# Patient Record
Sex: FEMALE | Race: WHITE | NOT HISPANIC OR LATINO
[De-identification: names, ages, dates, MRNs, and addresses within clinical notes are randomized per-mention and may not be internally consistent; named-entity substitution may affect disease eponyms.]

---

## 2019-07-02 PROBLEM — Z00.00 ENCOUNTER FOR PREVENTIVE HEALTH EXAMINATION: Status: ACTIVE | Noted: 2019-07-02

## 2019-07-03 ENCOUNTER — APPOINTMENT (OUTPATIENT)
Dept: HEART AND VASCULAR | Facility: CLINIC | Age: 44
End: 2019-07-03

## 2021-03-01 ENCOUNTER — APPOINTMENT (OUTPATIENT)
Dept: OTOLARYNGOLOGY | Facility: CLINIC | Age: 46
End: 2021-03-01

## 2024-05-29 ENCOUNTER — NON-APPOINTMENT (OUTPATIENT)
Age: 49
End: 2024-05-29

## 2024-05-29 ENCOUNTER — APPOINTMENT (OUTPATIENT)
Dept: HEART AND VASCULAR | Facility: CLINIC | Age: 49
End: 2024-05-29
Payer: COMMERCIAL

## 2024-05-29 VITALS
HEART RATE: 69 BPM | HEIGHT: 63 IN | DIASTOLIC BLOOD PRESSURE: 70 MMHG | BODY MASS INDEX: 23.74 KG/M2 | WEIGHT: 134 LBS | SYSTOLIC BLOOD PRESSURE: 106 MMHG

## 2024-05-29 DIAGNOSIS — Z82.49 FAMILY HISTORY OF ISCHEMIC HEART DISEASE AND OTHER DISEASES OF THE CIRCULATORY SYSTEM: ICD-10-CM

## 2024-05-29 DIAGNOSIS — I49.3 VENTRICULAR PREMATURE DEPOLARIZATION: ICD-10-CM

## 2024-05-29 DIAGNOSIS — Z78.9 OTHER SPECIFIED HEALTH STATUS: ICD-10-CM

## 2024-05-29 DIAGNOSIS — D86.9 SARCOIDOSIS, UNSPECIFIED: ICD-10-CM

## 2024-05-29 PROCEDURE — 93306 TTE W/DOPPLER COMPLETE: CPT

## 2024-05-29 PROCEDURE — ZZZZZ: CPT

## 2024-05-29 PROCEDURE — 99204 OFFICE O/P NEW MOD 45 MIN: CPT | Mod: 25

## 2024-05-29 PROCEDURE — 93000 ELECTROCARDIOGRAM COMPLETE: CPT

## 2024-05-29 NOTE — REASON FOR VISIT
[Symptom and Test Evaluation] : symptom and test evaluation [FreeTextEntry3] : Dr TORI Freeman  [FreeTextEntry1] : CV eval

## 2024-05-29 NOTE — DISCUSSION/SUMMARY
[FreeTextEntry1] : Patient has symptoms which are most suggestive extrasystoles based on her medical history of ventricular nature with no sustained tachyarrhythmias.  Patient has had minimal symptoms over the last number of days and so Holter monitoring will be of little use baseline echocardiogram revealed structurally normal heart with minimal valvular heart disease.  At the current time there is no need for treatment of the palpitations I did offer her the option of beta-blockers in the future.  Monitoring would be certainly indicated if there is a sustained prolonged periods of extrasystoles and/or arrhythmias there is no current sign of any sarcoid involvement in the myocardium cardiac MRI would be indicated to rule out sarcoid involvement in the heart which generally presents with heart block not necessarily PVCs [EKG obtained to assist in diagnosis and management of assessed problem(s)] : EKG obtained to assist in diagnosis and management of assessed problem(s)

## 2024-05-29 NOTE — REVIEW OF SYSTEMS
[Dyspnea on exertion] : dyspnea during exertion [Palpitations] : palpitations [Cough] : cough [Negative] : ENT [Headache] : no headache [Feeling Fatigued] : not feeling fatigued [SOB] : no shortness of breath [Wheezing] : no wheezing [Coughing Up Blood] : no hemoptysis [Snoring] : no snoring [Abdominal Pain] : no abdominal pain [Change in Appetite] : no change in appetite [Constipation] : no constipation

## 2024-05-29 NOTE — HISTORY OF PRESENT ILLNESS
[FreeTextEntry1] : 05/29/2024  RICARDO YOU is 49 year she with prior h/o    ( -- ) HTN    ( --  ) AODM    ( --  ) smoker  ( --  ) lipids   ( --  ) obesity   ( ++  )   fam hx of CAD present for cardiac evaluation palpations  starting 1-2 weeks ago  had racing feeling was seen in ER and noted to have PVCS  No effort related ssscp  Mild jett baseline  Had evaluation in past for sarcoid as noted on Ct scan not on meds at present

## 2024-07-23 ENCOUNTER — NON-APPOINTMENT (OUTPATIENT)
Age: 49
End: 2024-07-23

## 2024-07-24 ENCOUNTER — NON-APPOINTMENT (OUTPATIENT)
Age: 49
End: 2024-07-24

## 2024-07-24 ENCOUNTER — APPOINTMENT (OUTPATIENT)
Dept: HEART AND VASCULAR | Facility: CLINIC | Age: 49
End: 2024-07-24

## 2024-07-24 VITALS
RESPIRATION RATE: 12 BRPM | WEIGHT: 138 LBS | HEART RATE: 63 BPM | DIASTOLIC BLOOD PRESSURE: 70 MMHG | BODY MASS INDEX: 24.45 KG/M2 | SYSTOLIC BLOOD PRESSURE: 118 MMHG | HEIGHT: 63 IN

## 2024-07-24 DIAGNOSIS — D86.9 SARCOIDOSIS, UNSPECIFIED: ICD-10-CM

## 2024-07-24 DIAGNOSIS — Z00.00 ENCOUNTER FOR GENERAL ADULT MEDICAL EXAMINATION W/OUT ABNORMAL FINDINGS: ICD-10-CM

## 2024-07-24 PROCEDURE — 93225 XTRNL ECG REC<48 HRS REC: CPT

## 2024-07-24 PROCEDURE — 93000 ELECTROCARDIOGRAM COMPLETE: CPT | Mod: 59

## 2024-07-24 PROCEDURE — 99213 OFFICE O/P EST LOW 20 MIN: CPT | Mod: 25

## 2024-07-24 NOTE — HISTORY OF PRESENT ILLNESS
[FreeTextEntry1] : 05/29/2024  RICARDO YOU is 49 year she with prior h/o    ( -- ) HTN    ( --  ) AODM    ( --  ) smoker  ( --  ) lipids   ( --  ) obesity   ( ++  )   fam hx of CAD present for cardiac evaluation palpations  starting 1-2 weeks ago  had racing feeling was seen in ER and noted to have PVCS  No effort related ssscp  Mild jett baseline  Had evaluation in past for sarcoid as noted on Ct scan not on meds at present  07/24/24   frequent palpitations progressive going to [facility] for humidity related sob  episodes worse w walking resolved w rest  CT scan in 2/24 IS fibrosis ? sarcoid due to nodularity

## 2024-07-24 NOTE — DISCUSSION/SUMMARY
[FreeTextEntry1] : Ongoing palpitations  pvc noted in past ' will place Holter to assess need for meds  [facility] eval pending  [EKG obtained to assist in diagnosis and management of assessed problem(s)] : EKG obtained to assist in diagnosis and management of assessed problem(s)

## 2024-07-24 NOTE — ADDENDUM
[FreeTextEntry1] : Dalton Norris assisted in documentation on 07/24/24 acting as a scribe for Dr. Sloan Boston.

## 2024-07-30 PROCEDURE — 93227 XTRNL ECG REC<48 HR R&I: CPT

## 2024-07-31 ENCOUNTER — APPOINTMENT (OUTPATIENT)
Dept: HEART AND VASCULAR | Facility: CLINIC | Age: 49
End: 2024-07-31
Payer: COMMERCIAL

## 2024-07-31 VITALS
SYSTOLIC BLOOD PRESSURE: 118 MMHG | HEIGHT: 63 IN | WEIGHT: 136 LBS | DIASTOLIC BLOOD PRESSURE: 70 MMHG | BODY MASS INDEX: 24.1 KG/M2

## 2024-07-31 DIAGNOSIS — I49.3 VENTRICULAR PREMATURE DEPOLARIZATION: ICD-10-CM

## 2024-07-31 PROCEDURE — 99213 OFFICE O/P EST LOW 20 MIN: CPT

## 2024-07-31 RX ORDER — METOPROLOL SUCCINATE 25 MG/1
25 TABLET, EXTENDED RELEASE ORAL
Qty: 90 | Refills: 3 | Status: ACTIVE | COMMUNITY
Start: 2024-07-31 | End: 1900-01-01

## 2024-07-31 NOTE — HISTORY OF PRESENT ILLNESS
[FreeTextEntry1] : 05/29/2024  RICARDO YOU is 49 year she with prior h/o    ( -- ) HTN    ( --  ) AODM    ( --  ) smoker  ( --  ) lipids   ( --  ) obesity   ( ++  )   fam hx of CAD present for cardiac evaluation palpations  starting 1-2 weeks ago  had racing feeling was seen in ER and noted to have PVCS  No effort related ssscp  Mild jett baseline  Had evaluation in past for sarcoid as noted on Ct scan not on meds at present  07/24/24   frequent palpitations progressive going to [facility] for humidity related sob  episodes worse w walking resolved w rest  CT scan in 2/24 IS fibrosis ? sarcoid due to nodularity   07/31/24 was in ER again for reapid HB was noted to have PVC  hct 33 seen b heme needs GI eval for anemia  holter 1000 PV .61% mo sustained arrhythmias
